# Patient Record
Sex: MALE
[De-identification: names, ages, dates, MRNs, and addresses within clinical notes are randomized per-mention and may not be internally consistent; named-entity substitution may affect disease eponyms.]

---

## 2019-11-16 ENCOUNTER — TRANSCRIPTION ENCOUNTER (OUTPATIENT)
Age: 44
End: 2019-11-16

## 2020-04-26 ENCOUNTER — MESSAGE (OUTPATIENT)
Age: 45
End: 2020-04-26

## 2022-08-21 ENCOUNTER — NON-APPOINTMENT (OUTPATIENT)
Age: 47
End: 2022-08-21

## 2023-03-05 ENCOUNTER — APPOINTMENT (OUTPATIENT)
Dept: AFTER HOURS CARE | Facility: EMERGENCY ROOM | Age: 48
End: 2023-03-05
Payer: COMMERCIAL

## 2023-03-05 DIAGNOSIS — U07.1 COVID-19: ICD-10-CM

## 2023-03-05 PROBLEM — Z00.00 ENCOUNTER FOR PREVENTIVE HEALTH EXAMINATION: Status: ACTIVE | Noted: 2023-03-05

## 2023-03-05 PROCEDURE — 99204 OFFICE O/P NEW MOD 45 MIN: CPT | Mod: 95

## 2023-03-05 RX ORDER — NIRMATRELVIR AND RITONAVIR 300-100 MG
20 X 150 MG & KIT ORAL
Qty: 30 | Refills: 0 | Status: ACTIVE | COMMUNITY
Start: 2023-03-05 | End: 1900-01-01

## 2023-03-09 NOTE — PLAN
[With new medications prescribed] : Treat in place: with new medications prescribed [FreeTextEntry1] : 1.	Rx: Paxlovid\par 2.	OTC: Tylenol or Motrin as needed for fever or pain management\par 3.	Follow up with your PMD\par 4.	Monitor for red flag symptoms as discussed

## 2023-03-09 NOTE — HISTORY OF PRESENT ILLNESS
[Home] : at home, [unfilled] , at the time of the visit. [Other Location: e.g. Home (Enter Location, City,State)___] : at [unfilled] [Verbal consent obtained from patient] : the patient, [unfilled] [FreeTextEntry8] : 49 yo male with PMH for evaluation of covid symptoms. Symptoms onset yesterday, pcr positive. \par Symptoms include: rhinorrhea, cough, fever, chills. Symptoms are progressively improving. Has taken Nyquil with improvement.Denies chest pain, shortness of breath, nausea, vomiting, abd pain. Tolerating PO. \par Denies Sick contacts, recent travel\par Allergies: fish\par Nonsmoker\par Medications: haloperidol, propanolol, benztropine\par PMD: Dr. Brooks Tolentino\par Vaccinated, boosted\par

## 2023-08-07 ENCOUNTER — OFFICE (OUTPATIENT)
Dept: URBAN - METROPOLITAN AREA CLINIC 35 | Facility: CLINIC | Age: 48
Setting detail: OPHTHALMOLOGY
End: 2023-08-07
Payer: COMMERCIAL

## 2023-08-07 ENCOUNTER — RX ONLY (RX ONLY)
Age: 48
End: 2023-08-07

## 2023-08-07 DIAGNOSIS — H31.003: ICD-10-CM

## 2023-08-07 DIAGNOSIS — H16.223: ICD-10-CM

## 2023-08-07 DIAGNOSIS — H02.89: ICD-10-CM

## 2023-08-07 DIAGNOSIS — H43.393: ICD-10-CM

## 2023-08-07 DIAGNOSIS — D31.00: ICD-10-CM

## 2023-08-07 PROBLEM — H52.13 MYOPIA; BOTH EYES: Status: ACTIVE | Noted: 2023-08-07

## 2023-08-07 PROCEDURE — 92014 COMPRE OPH EXAM EST PT 1/>: CPT | Performed by: OPHTHALMOLOGY

## 2023-08-07 ASSESSMENT — REFRACTION_MANIFEST
OS_VA1: 20/20
OS_ADD: +1.75
OS_VA1: 20/25+3
OS_AXIS: 070
OS_SPHERE: -7.25
OD_SPHERE: -6.75
OD_CYLINDER: -0.75
OD_SPHERE: -6.00
OS_SPHERE: -6.50
OD_VA1: 20/25
OD_CYLINDER: -0.50
OD_AXIS: 015
OD_SPHERE: -5.50
OS_ADD: +2.00
OD_SPHERE: -6.50
OD_ADD: +1.75
OS_CYLINDER: -0.50
OS_AXIS: 170
OS_CYLINDER: +0.50
OD_CYLINDER: -0.50
OS_AXIS: 065
OS_SPHERE: -6.50
OD_VA1: 20/20
OD_AXIS: 090
OS_CYLINDER: +0.75
OD_CYLINDER: +0.50
OD_CYLINDER: +0.50
OS_SPHERE: -6.50
OD_AXIS: 105
OD_AXIS: 095
OS_VA1: 20/20-2
OS_AXIS: 060
OS_CYLINDER: -0.50
OD_SPHERE: -6.00
OS_CYLINDER: -0.50
OD_AXIS: 010
OS_SPHERE: -6.50
OS_AXIS: 151
OD_ADD: +2.00
OD_VA1: 20/20

## 2023-08-07 ASSESSMENT — REFRACTION_CURRENTRX
OD_AXIS: 088
OS_SPHERE: -6.25
OD_SPHERE: -6.25
OS_OVR_VA: 20/
OD_ADD: +1.25
OD_CYLINDER: -0.75
OD_VPRISM_DIRECTION: PROGS
OS_ADD: +1.25
OD_OVR_VA: 20/
OS_AXIS: 077
OS_VPRISM_DIRECTION: PROGS
OS_CYLINDER: -0.75

## 2023-08-07 ASSESSMENT — KERATOMETRY
OD_K1POWER_DIOPTERS: 43.75
OS_AXISANGLE_DEGREES: 099
OS_K1POWER_DIOPTERS: 44.00
OS_K2POWER_DIOPTERS: 45.00
OD_AXISANGLE_DEGREES: 084
OD_K2POWER_DIOPTERS: 44.75

## 2023-08-07 ASSESSMENT — AXIALLENGTH_DERIVED
OD_AL: 25.8516
OD_AL: 25.969
OD_AL: 25.7932
OS_AL: 25.7999
OS_AL: 26.2141
OS_AL: 25.9758
OS_AL: 26.0944
OD_AL: 25.969
OD_AL: 26.0876
OD_AL: 25.969
OS_AL: 26.0944
OS_AL: 26.0944

## 2023-08-07 ASSESSMENT — SPHEQUIV_DERIVED
OD_SPHEQUIV: -6
OS_SPHEQUIV: -6.75
OD_SPHEQUIV: -6.25
OS_SPHEQUIV: -6.75
OD_SPHEQUIV: -6.5
OD_SPHEQUIV: -6.25
OD_SPHEQUIV: -5.875
OS_SPHEQUIV: -6.75
OS_SPHEQUIV: -7
OS_SPHEQUIV: -6.5
OS_SPHEQUIV: -6.125
OD_SPHEQUIV: -6.25

## 2023-08-07 ASSESSMENT — SUPERFICIAL PUNCTATE KERATITIS (SPK)
OS_SPK: T
OD_SPK: T

## 2023-08-07 ASSESSMENT — LID EXAM ASSESSMENTS
OD_MEIBOMITIS: RLL 1+
OS_MEIBOMITIS: LLL 1+

## 2023-08-07 ASSESSMENT — TONOMETRY
OD_IOP_MMHG: 16
OS_IOP_MMHG: 16

## 2023-08-07 ASSESSMENT — VISUAL ACUITY
OD_BCVA: 20/20-3
OS_BCVA: 20/20-3

## 2023-08-07 ASSESSMENT — CONFRONTATIONAL VISUAL FIELD TEST (CVF)
OS_FINDINGS: FULL
OD_FINDINGS: FULL

## 2023-08-07 ASSESSMENT — REFRACTION_AUTOREFRACTION
OD_SPHERE: -5.75
OS_SPHERE: -6.25
OD_CYLINDER: -0.50
OS_AXIS: 067
OD_AXIS: 102
OS_CYLINDER: -0.50